# Patient Record
Sex: FEMALE | URBAN - METROPOLITAN AREA
[De-identification: names, ages, dates, MRNs, and addresses within clinical notes are randomized per-mention and may not be internally consistent; named-entity substitution may affect disease eponyms.]

---

## 2019-09-30 ENCOUNTER — OFFICE VISIT (OUTPATIENT)
Dept: FAMILY MEDICINE | Facility: CLINIC | Age: 39
End: 2019-09-30
Payer: COMMERCIAL

## 2019-09-30 VITALS
TEMPERATURE: 97.3 F | HEIGHT: 62 IN | OXYGEN SATURATION: 99 % | BODY MASS INDEX: 23.55 KG/M2 | DIASTOLIC BLOOD PRESSURE: 62 MMHG | WEIGHT: 128 LBS | HEART RATE: 107 BPM | SYSTOLIC BLOOD PRESSURE: 94 MMHG | RESPIRATION RATE: 16 BRPM

## 2019-09-30 DIAGNOSIS — F41.0 ANXIETY ATTACK: Primary | ICD-10-CM

## 2019-09-30 RX ORDER — COPPER 313.4 MG/1
1 INTRAUTERINE DEVICE INTRAUTERINE ONCE
COMMUNITY

## 2019-09-30 RX ORDER — HYDROXYZINE HYDROCHLORIDE 10 MG/1
10 TABLET, FILM COATED ORAL EVERY 6 HOURS PRN
Qty: 30 TABLET | Refills: 0 | Status: SHIPPED | OUTPATIENT
Start: 2019-09-30 | End: 2019-09-30

## 2019-09-30 RX ORDER — HYDROXYZINE HYDROCHLORIDE 10 MG/1
10 TABLET, FILM COATED ORAL EVERY 6 HOURS PRN
Qty: 30 TABLET | Refills: 0 | Status: SHIPPED | OUTPATIENT
Start: 2019-09-30

## 2019-09-30 SDOH — HEALTH STABILITY: MENTAL HEALTH: HOW OFTEN DO YOU HAVE A DRINK CONTAINING ALCOHOL?: 2-3 TIMES A WEEK

## 2019-09-30 SDOH — HEALTH STABILITY: MENTAL HEALTH: HOW OFTEN DO YOU HAVE 6 OR MORE DRINKS ON ONE OCCASION?: NEVER

## 2019-09-30 SDOH — HEALTH STABILITY: MENTAL HEALTH: HOW MANY STANDARD DRINKS CONTAINING ALCOHOL DO YOU HAVE ON A TYPICAL DAY?: 3 OR 4

## 2019-09-30 ASSESSMENT — ENCOUNTER SYMPTOMS
DECREASED CONCENTRATION: 1
WOUND: 0
SHORTNESS OF BREATH: 1
HALLUCINATIONS: 0
NERVOUS/ANXIOUS: 1
NAUSEA: 0
CONFUSION: 0
DIZZINESS: 0
FATIGUE: 0
HYPERACTIVE: 0
BLOOD IN STOOL: 0
TREMORS: 0
SLEEP DISTURBANCE: 1
AGITATION: 0
VOMITING: 0
CHEST TIGHTNESS: 1
CONSTIPATION: 0
NUMBNESS: 0
WEAKNESS: 0
COLOR CHANGE: 1
ACTIVITY CHANGE: 0
PALPITATIONS: 1
COUGH: 0
HEADACHES: 0
WHEEZING: 0
APPETITE CHANGE: 0
APNEA: 0
DIARRHEA: 0
LIGHT-HEADEDNESS: 1

## 2019-09-30 ASSESSMENT — PATIENT HEALTH QUESTIONNAIRE - PHQ9
5. POOR APPETITE OR OVEREATING: SEVERAL DAYS
SUM OF ALL RESPONSES TO PHQ QUESTIONS 1-9: 4

## 2019-09-30 ASSESSMENT — ANXIETY QUESTIONNAIRES
7. FEELING AFRAID AS IF SOMETHING AWFUL MIGHT HAPPEN: SEVERAL DAYS
1. FEELING NERVOUS, ANXIOUS, OR ON EDGE: SEVERAL DAYS
3. WORRYING TOO MUCH ABOUT DIFFERENT THINGS: SEVERAL DAYS
2. NOT BEING ABLE TO STOP OR CONTROL WORRYING: SEVERAL DAYS
5. BEING SO RESTLESS THAT IT IS HARD TO SIT STILL: NOT AT ALL
IF YOU CHECKED OFF ANY PROBLEMS ON THIS QUESTIONNAIRE, HOW DIFFICULT HAVE THESE PROBLEMS MADE IT FOR YOU TO DO YOUR WORK, TAKE CARE OF THINGS AT HOME, OR GET ALONG WITH OTHER PEOPLE: NOT DIFFICULT AT ALL
GAD7 TOTAL SCORE: 5
6. BECOMING EASILY ANNOYED OR IRRITABLE: NOT AT ALL

## 2019-09-30 ASSESSMENT — MIFFLIN-ST. JEOR: SCORE: 1227.03

## 2019-09-30 NOTE — NURSING NOTE
"38 year old  Chief Complaint   Patient presents with     Consult     Pt. presents to the clinic today with heart racing, hard to catch her breath, dizziness.        Blood pressure 94/62, pulse 107, temperature 97.3  F (36.3  C), temperature source Oral, resp. rate 16, height 1.58 m (5' 2.2\"), weight 58.1 kg (128 lb), SpO2 99 %. Body mass index is 23.26 kg/m .  BP completed using cuff size:    There is no problem list on file for this patient.      Wt Readings from Last 2 Encounters:   09/30/19 58.1 kg (128 lb)     BP Readings from Last 3 Encounters:   09/30/19 94/62       Allergies   Allergen Reactions     Amoxicillin        No current outpatient medications on file.     No current facility-administered medications for this visit.        Social History     Tobacco Use     Smoking status: Former Smoker     Packs/day: 0.00     Years: 8.00     Pack years: 0.00     Smokeless tobacco: Never Used   Substance Use Topics     Alcohol use: Yes     Frequency: 2-3 times a week     Drinks per session: 3 or 4     Binge frequency: Never     Drug use: Never       Honoring Choices - Health Care Directive Guide offered to patient at time of visit.    Health Maintenance Due   Topic Date Due     PREVENTIVE CARE VISIT  1982     HIV SCREENING  12/10/1997     HPV  12/10/2003     PAP  12/10/2007     DTAP/TDAP/TD IMMUNIZATION (1 - Tdap) 12/10/2007     PHQ-2  01/01/2019     INFLUENZA VACCINE (1) 09/01/2019         There is no immunization history on file for this patient.    No results found for: PAP      No lab results found.    PHQ-2 ( 1999 Pfizer) 9/30/2019   Q1: Little interest or pleasure in doing things 1   Q2: Feeling down, depressed or hopeless 1   PHQ-2 Score 2       PHQ-9 SCORE 9/30/2019   PHQ-9 Total Score 4       JAYE-7 SCORE 9/30/2019   Total Score 5       No flowsheet data found.    Lashanda Rhodes CMA  September 30, 2019 1:52 PM    "

## 2019-09-30 NOTE — PROGRESS NOTES
HPI       Dibra Flladina is a 38 year old  who presents for   Chief Complaint   Patient presents with     Consult     Pt. presents to the clinic today with heart racing, hard to catch her breath, dizziness.      Prevents to clinic for evaluation of sudden development of dizziness, palpitations, difficulty breathing, and feeling of impending doom.     Christian was leaving a restaurant after having lunch of sushi when she suddenly noticed above symptoms as well as feeling warm/flushed. Looked down and saw her extremities were flushed as well as her face and torso. Has never experienced this before and uncertain of what had happened. Christian is visiting [place] from [place] for work, is here as a  and her job is very stressful. Has been working for her company for past 1 year and 10 months and is actively seeking new employment d/t stress levels. Has not been sleeping well for the past year from work-related stress. Has taken Xanax in past (took one from a friend) and did not like how it made her feel, is not interested in having today.     Denies any outright anxiety, no angina, no family or personal health history of cardiovascular disease or illness. Has a history of thalassemia however is under control with PCP in [place]; denies any acute bleeding or bruising issues. No signs of DVT from flight, no history of clotting disorders. Denies any angina, back pain, or cyanosis.     Is currently here alone from her company, has co-workers joining her tomorrow. She will be here through Thursday.     Problem, Medication and Allergy Lists were       Current Outpatient Medications   Medication Sig Dispense Refill     hydrOXYzine (ATARAX) 10 MG tablet Take 1 tablet (10 mg) by mouth every 6 hours as needed for anxiety 30 tablet 0     paragard intrauterine copper device 1 each by Intrauterine route once           Allergies   Allergen Reactions     Amoxicillin        Patient is a new patient to this clinic and so  I  "reviewed/updated the Past Medical History, the Family History and the Social History .         Review of Systems:   Review of Systems   Constitutional: Negative for activity change, appetite change and fatigue.   Respiratory: Positive for chest tightness and shortness of breath. Negative for apnea, cough and wheezing.    Cardiovascular: Positive for palpitations. Negative for chest pain and leg swelling.   Gastrointestinal: Negative for blood in stool, constipation, diarrhea, nausea and vomiting.   Skin: Positive for color change. Negative for pallor, rash and wound.   Neurological: Positive for light-headedness. Negative for dizziness, tremors, syncope, weakness, numbness and headaches.   Psychiatric/Behavioral: Positive for decreased concentration and sleep disturbance. Negative for agitation, behavioral problems, confusion, hallucinations, self-injury and suicidal ideas. The patient is nervous/anxious. The patient is not hyperactive.    All other systems reviewed and are negative.           Physical Exam:     Vitals:    09/30/19 1346   BP: 94/62   BP Location: Left arm   Patient Position: Chair   Cuff Size: Adult Regular   Pulse: 107   Resp: 16   Temp: 97.3  F (36.3  C)   TempSrc: Oral   SpO2: 99%   Weight: 58.1 kg (128 lb)   Height: 1.58 m (5' 2.2\")     Body mass index is 23.26 kg/m .  Vitals were reviewed and were normal     Physical Exam  Vitals signs and nursing note reviewed.   Constitutional:       General: She is not in acute distress.     Appearance: Normal appearance. She is normal weight. She is not ill-appearing, toxic-appearing or diaphoretic.   HENT:      Head: Normocephalic and atraumatic.      Right Ear: Tympanic membrane, ear canal and external ear normal. There is no impacted cerumen.      Left Ear: Tympanic membrane, ear canal and external ear normal. There is no impacted cerumen.      Nose: Nose normal.      Mouth/Throat:      Mouth: Mucous membranes are moist.   Eyes:      Conjunctiva/sclera: " Conjunctivae normal.   Neck:      Musculoskeletal: Normal range of motion and neck supple.   Cardiovascular:      Rate and Rhythm: Regular rhythm. Tachycardia present.      Pulses: Normal pulses.      Heart sounds: Normal heart sounds. No murmur. No friction rub. No gallop.    Pulmonary:      Effort: Pulmonary effort is normal. No respiratory distress.      Breath sounds: Normal breath sounds. No stridor. No wheezing, rhonchi or rales.   Chest:      Chest wall: No tenderness.   Abdominal:      General: Abdomen is flat. Bowel sounds are normal. There is no distension.      Palpations: Abdomen is soft. There is no mass.      Tenderness: There is no tenderness. There is no guarding or rebound.      Hernia: No hernia is present.   Lymphadenopathy:      Cervical: No cervical adenopathy.   Skin:     General: Skin is warm and dry.      Findings: Erythema present.      Comments: Patient flushed in face, torso, extremities.    Neurological:      General: No focal deficit present.      Mental Status: She is alert and oriented to person, place, and time.   Psychiatric:         Mood and Affect: Mood is anxious.      Comments: Near tears during majority of visit         Results:   No testing ordered today    Assessment and Plan        1. Anxiety attack  As patient began talking with NP about work, what is going on, and symptoms noted she was feeling better. With improvement of palpitations, dyspnea and consistent oxygen saturation level % and gradual improvement of tachycardia unlikely to be myocardial infarction, pulmonary embolism, and more likely to be anxiety related. Reviewed side effects of medication, risks and benefits, and proper medication administration. Discussed red flag symptoms and when patient should seek immediate medical attention. Dibra Flladina verbalized understanding.   - hydrOXYzine (ATARAX) 10 MG tablet; Take 1 tablet (10 mg) by mouth every 6 hours as needed for anxiety  Dispense: 30 tablet; Refill:  0       There are no discontinued medications.    Options for treatment and follow-up care were reviewed with the patient. Dibra Flladina  engaged in the decision making process and verbalized understanding of the options discussed and agreed with the final plan.    BERYL Alatorre CNP

## 2019-09-30 NOTE — PATIENT INSTRUCTIONS
Patient Education     What is this medicine?  HYDROXYZINE (sal DROX i zeen) is an antihistamine. This medicine is used to treat allergy symptoms. It is also used to treat anxiety and tension. This medicine can be used with other medicines to induce sleep before surgery.  This medicine may be used for other purposes; ask your health care provider or pharmacist if you have questions.  What should I tell my health care provider before I take this medicine?  They need to know if you have any of these conditions:    any chronic illness    diabetes    difficulty passing urine    glaucoma    heart disease    kidney disease    liver disease    lung disease    an unusual or allergic reaction to hydroxyzine, cetirizine, other medicines, foods, dyes, or preservatives    pregnant or trying to get pregnant    breast-feeding  How should I use this medicine?  Take this medicine by mouth with a full glass of water. Follow the directions on the prescription label. Use a specially marked spoon or dropper to measure every dose. Ask your pharmacist if you do not have one. Household spoons are not accurate. You may take this medicine with food or on an empty stomach. Take your medicine at regular intervals. Do not take your medicine more often than directed.  Talk to your pediatrician regarding the use of this medicine in children. Special care may be needed. While this drug may be prescribed for children as young as 2 years of age for selected conditions, precautions do apply.  Patients over 65 years old may have a stronger reaction and need a smaller dose.  Overdosage: If you think you have taken too much of this medicine contact a poison control center or emergency room at once.  NOTE: This medicine is only for you. Do not share this medicine with others.  What if I miss a dose?  If you miss a dose, take it as soon as you can. If it is almost time for your next dose, take only that dose. Do not take double or extra doses.  What may  interact with this medicine?    alcohol    barbiturate medicines for sleep or seizures    medicines for colds, allergies    medicines for depression, anxiety, or emotional disturbances    medicines for pain    medicines for sleep    muscle relaxants  This list may not describe all possible interactions. Give your health care provider a list of all the medicines, herbs, non-prescription drugs, or dietary supplements you use. Also tell them if you smoke, drink alcohol, or use illegal drugs. Some items may interact with your medicine.  What should I watch for while using this medicine?  Tell your doctor or health care professional if your symptoms do not improve.  You may get drowsy or dizzy. Do not drive, use machinery, or do anything that needs mental alertness until you know how this medicine affects you. Do not stand or sit up quickly, especially if you are an older patient. This reduces the risk of dizzy or fainting spells. Alcohol may interfere with the effect of this medicine. Avoid alcoholic drinks.  Your mouth may get dry. Chewing sugarless gum or sucking hard candy, and drinking plenty of water may help. Contact your doctor if the problem does not go away or is severe.  This medicine may cause dry eyes and blurred vision. If you wear contact lenses you may feel some discomfort. Lubricating drops may help. See your eye doctor if the problem does not go away or is severe.  If you are receiving skin tests for allergies, tell your doctor you are using this medicine.  What side effects may I notice from receiving this medicine?  Side effects that you should report to your doctor or health care professional as soon as possible:    difficulty passing urine    fast or irregular heartbeat    seizures    slurred speech or confusion    tremor  Side effects that usually do not require medical attention (report to your doctor or health care professional if they continue or are  bothersome):    constipation    drowsiness    fatigue    headache    stomach upset  This list may not describe all possible side effects. Call your doctor for medical advice about side effects. You may report side effects to FDA at 2-496-NPG-5112.  Where should I keep my medicine?  Keep out of the reach of children.  Store at room temperature between 15 and 30 degrees C (59 and 86 degrees F). Do not freeze. Protect from light. Throw away any unused medicine after the expiration date.  NOTE:This sheet is a summary. It may not cover all possible information. If you have questions about this medicine, talk to your doctor, pharmacist, or health care provider. Copyright  2016 [employer]

## 2019-10-01 ASSESSMENT — ANXIETY QUESTIONNAIRES: GAD7 TOTAL SCORE: 5
